# Patient Record
Sex: MALE | Race: WHITE | NOT HISPANIC OR LATINO | URBAN - METROPOLITAN AREA
[De-identification: names, ages, dates, MRNs, and addresses within clinical notes are randomized per-mention and may not be internally consistent; named-entity substitution may affect disease eponyms.]

---

## 2018-07-16 RX ORDER — PANTOPRAZOLE SODIUM 40 MG/1
TABLET, DELAYED RELEASE ORAL
COMMUNITY

## 2018-07-16 RX ORDER — NAPROXEN SODIUM 275 MG/1
TABLET ORAL
COMMUNITY
Start: 2017-05-18 | End: 2020-01-08 | Stop reason: SDUPTHER

## 2018-07-16 RX ORDER — RIZATRIPTAN BENZOATE 10 MG/1
TABLET ORAL
COMMUNITY
Start: 2017-05-18 | End: 2020-06-05 | Stop reason: SDUPTHER

## 2018-07-18 ENCOUNTER — OFFICE VISIT (OUTPATIENT)
Dept: NEUROLOGY | Facility: CLINIC | Age: 23
End: 2018-07-18
Payer: COMMERCIAL

## 2018-07-18 VITALS — SYSTOLIC BLOOD PRESSURE: 118 MMHG | DIASTOLIC BLOOD PRESSURE: 72 MMHG | HEART RATE: 77 BPM | RESPIRATION RATE: 17 BRPM

## 2018-07-18 DIAGNOSIS — G43.109 MIGRAINE WITH AURA AND WITHOUT STATUS MIGRAINOSUS, NOT INTRACTABLE: Primary | ICD-10-CM

## 2018-07-18 PROCEDURE — 99213 OFFICE O/P EST LOW 20 MIN: CPT | Performed by: PSYCHIATRY & NEUROLOGY

## 2018-07-18 NOTE — PROGRESS NOTES
"7/18/2018    Subjective      Patient ID: Cordell Solitario is a 22 y.o. male.    HPI     The patient was back in the office today.  He was last seen in January.  Thankfully he is only had 2 bad headaches since I last saw him.  Both were associated with visual complaints.  He is using the Maxalt.  I reminded him that he can repeat the Maxalt.  1 of his headaches was especially prolonged.  I told him that he can take the Maxalt with the naproxen on a consistent basis he may repeat these 2 drugs together in 2 hours if he does not improve.  Again it should be best if he takes the Maxalt after he has had the resolution of his visual complaints, the aura he is getting some insight on his GI upset and this may be a \"vagus nerve problem\".  Is seeing both the cardiologist and a GI doctor.  He is about to reenter school in September.  His mood is good.  He is sleeping well.  He is comfortable with his headache regimen the way it is.    Current Outpatient Prescriptions   Medication Sig Dispense Refill   • naproxen sodium (ANAPROX) 275 mg tablet take 1 tablet by oral route up to tid p meals prn     • pantoprazole (PROTONIX) 40 mg EC tablet take 1 tablet by oral route  every day     • rizatriptan (MAXALT) 10 mg tablet take 1 tablet every 2 hrs as needed, up to 2/d, 2d/wk       No current facility-administered medications for this visit.        The following have been reviewed and updated as appropriate in this visit:       Review of Systems   Neg    Objective     Physical Exam    On examination the patient is well-developed and well-nourished.The patient is alert and oriented to time place and person, the patient has intact recent and remote memory, with appropriate attention span and concentration.  The patient is appropriate with language,can name objects, can repeat phrases, and has has intact spontaneous speech.  The patient is aware of current events, past history, and he has an intact vocabulary.  There is normal station " and gait.  There is normal strength and muscle tone of all major muscle groups of the upper and lower extremities.  There is no fasciculations or atrophy.  Cardiac evaluation is normal, with regular rate and rhythm.  There are no murmurs or gallops.      Assessment/Plan   Problem List Items Addressed This Visit     None      Visit Diagnoses     Migraine with aura and without status migrainosus, not intractable    -  Primary    Relevant Medications    naproxen sodium (ANAPROX) 275 mg tablet    rizatriptan (MAXALT) 10 mg tablet          I will continue him on his present medication.  I would like to see him in 6 months.  He should reach out to me if there are any problems or prolonged headaches in the meantime.

## 2018-07-31 ENCOUNTER — TELEPHONE (OUTPATIENT)
Dept: NEUROLOGY | Facility: CLINIC | Age: 23
End: 2018-07-31

## 2018-08-06 ENCOUNTER — LAB REQUISITION (OUTPATIENT)
Dept: LAB | Facility: HOSPITAL | Age: 23
End: 2018-08-06
Attending: OTOLARYNGOLOGY
Payer: COMMERCIAL

## 2018-08-06 DIAGNOSIS — J03.90 ACUTE TONSILLITIS: ICD-10-CM

## 2018-08-06 PROCEDURE — 87070 CULTURE OTHR SPECIMN AEROBIC: CPT | Performed by: OTOLARYNGOLOGY

## 2018-08-08 LAB — MICROORGANISM SPEC CULT: NORMAL

## 2018-10-11 ENCOUNTER — HOSPITAL ENCOUNTER (EMERGENCY)
Facility: HOSPITAL | Age: 23
Discharge: HOME | End: 2018-10-11
Attending: EMERGENCY MEDICINE | Admitting: STUDENT IN AN ORGANIZED HEALTH CARE EDUCATION/TRAINING PROGRAM

## 2018-10-11 VITALS
HEART RATE: 79 BPM | OXYGEN SATURATION: 99 % | BODY MASS INDEX: 25.27 KG/M2 | SYSTOLIC BLOOD PRESSURE: 141 MMHG | TEMPERATURE: 97.9 F | RESPIRATION RATE: 18 BRPM | HEIGHT: 67 IN | DIASTOLIC BLOOD PRESSURE: 65 MMHG | WEIGHT: 161 LBS

## 2018-10-11 DIAGNOSIS — S01.111A EYEBROW LACERATION, RIGHT, INITIAL ENCOUNTER: Primary | ICD-10-CM

## 2018-10-11 PROCEDURE — 90715 TDAP VACCINE 7 YRS/> IM: CPT | Performed by: PHYSICIAN ASSISTANT

## 2018-10-11 PROCEDURE — 99283 EMERGENCY DEPT VISIT LOW MDM: CPT

## 2018-10-11 PROCEDURE — 25010000002 TDAP 5-2.5-18.5 LF-MCG/0.5 SUSPENSION: Performed by: PHYSICIAN ASSISTANT

## 2018-10-11 PROCEDURE — 90471 IMMUNIZATION ADMIN: CPT | Performed by: PHYSICIAN ASSISTANT

## 2018-10-11 RX ORDER — PANTOPRAZOLE SODIUM 40 MG/1
40 TABLET, DELAYED RELEASE ORAL DAILY
COMMUNITY

## 2018-10-11 RX ORDER — LIDOCAINE HYDROCHLORIDE AND EPINEPHRINE 10; 10 MG/ML; UG/ML
10 INJECTION, SOLUTION INFILTRATION; PERINEURAL ONCE
Status: COMPLETED | OUTPATIENT
Start: 2018-10-11 | End: 2018-10-11

## 2018-10-11 RX ADMIN — LIDOCAINE HYDROCHLORIDE,EPINEPHRINE BITARTRATE 10 ML: 10; .01 INJECTION, SOLUTION INFILTRATION; PERINEURAL at 22:52

## 2018-10-11 RX ADMIN — TETANUS TOXOID, REDUCED DIPHTHERIA TOXOID AND ACELLULAR PERTUSSIS VACCINE, ADSORBED 0.5 ML: 5; 2.5; 8; 8; 2.5 SUSPENSION INTRAMUSCULAR at 22:51

## 2018-10-12 NOTE — DISCHARGE INSTRUCTIONS
Keep wound clean and dry.  Wound recheck in 2 days, suture removal in 5 days.  Observe for any sign of infection.  Return to the emergency department immediately if any change or worsening of symptoms.

## 2018-10-12 NOTE — ED PROVIDER NOTES
Subjective   14-year-old male presents to emergency department with right eyebrow laceration.  Struck another player's head while playing flag football.  No loss of consciousness vision changes or photophobia.  No neck pain nausea vomiting unilateral weakness paresis paresthesias or other symptoms or injuries.  Last tetanus immunization was unknown.        Illness   Location:  Per HPI  Quality:  Per HPI  Severity:  Moderate  Onset quality:  Sudden  Duration:  1 hour  Timing:  Constant  Progression:  Unchanged  Chronicity:  New  Context:  Per HPI  Relieved by:  Per HPI  Worsened by:  Per HPI  Ineffective treatments:  Per HPI  Associated symptoms: no chest pain, no congestion, no cough, no headaches, no nausea, no shortness of breath and no vomiting        Review of Systems   HENT: Negative for congestion.         Per HPI   Respiratory: Negative for cough and shortness of breath.    Cardiovascular: Negative for chest pain.   Gastrointestinal: Negative for nausea and vomiting.   Skin: Positive for wound.        Per HPI   Neurological: Negative for headaches.       Past Medical History:   Diagnosis Date   • GERD (gastroesophageal reflux disease)        No Known Allergies    History reviewed. No pertinent surgical history.    History reviewed. No pertinent family history.    Social History     Social History   • Marital status: Single     Social History Main Topics   • Smoking status: Current Some Day Smoker   • Alcohol use Yes   • Drug use: No   • Sexual activity: Defer     Other Topics Concern   • Not on file           Objective   Physical Exam   Constitutional: He is oriented to person, place, and time. He appears well-developed and well-nourished. No distress.   HENT:   Head: Normocephalic.   Right Ear: External ear normal.   Left Ear: External ear normal.   Nose: Nose normal.   Mouth/Throat: Oropharynx is clear and moist. No oropharyngeal exudate.   1.5 centimeter laceration to superolateral right orbital rim, no  bony tenderness crepitus or step-off.  TMs and canals are clear oropharynx is clear.   Eyes: Pupils are equal, round, and reactive to light. Conjunctivae and EOM are normal. Right eye exhibits no discharge. Left eye exhibits no discharge. No scleral icterus.   Neck: Normal range of motion. Neck supple. No JVD present. No tracheal deviation present. No thyromegaly present.   Cardiovascular: Normal rate.    Pulmonary/Chest: Effort normal. No stridor. No respiratory distress.   Abdominal: Soft. He exhibits no distension.   Musculoskeletal: Normal range of motion. He exhibits no edema, tenderness or deformity.   Neurological: He is alert and oriented to person, place, and time. No cranial nerve deficit. He exhibits normal muscle tone. Coordination normal.   Skin: Skin is warm and dry. No rash noted. He is not diaphoretic. No erythema. No pallor.   Psychiatric: He has a normal mood and affect. His behavior is normal. Judgment and thought content normal.   Nursing note and vitals reviewed.      Laceration Repair  Date/Time: 10/12/2018 12:01 AM  Performed by: ANTONIA BROOKS  Authorized by: IDANIA PILLAI     Consent:     Consent obtained:  Verbal    Consent given by:  Patient    Risks discussed:  Poor cosmetic result  Anesthesia (see MAR for exact dosages):     Anesthesia method:  None  Laceration details:     Location: Right eyebrow.    Length (cm):  2    Depth (mm):  3  Repair type:     Repair type:  Simple  Pre-procedure details:     Preparation:  Patient was prepped and draped in usual sterile fashion  Exploration:     Hemostasis achieved with:  Direct pressure    Wound exploration: entire depth of wound probed and visualized      Wound extent: no foreign bodies/material noted, no nerve damage noted, no tendon damage noted and no underlying fracture noted    Treatment:     Wound cleansed with: Chlor-prep.    Amount of cleaning:  Standard    Irrigation solution:  Sterile saline    Irrigation volume:  20     "Irrigation method:  Syringe    Visualized foreign bodies/material removed: yes    Skin repair:     Repair method:  Sutures    Suture size:  6-0    Suture material:  Nylon    Suture technique:  Simple interrupted    Number of sutures:  8  Approximation:     Approximation:  Close    Vermilion border: well-aligned    Post-procedure details:     Dressing:  Open (no dressing)    Patient tolerance of procedure:  Tolerated well, no immediate complications               ED Course      No results found for this or any previous visit (from the past 24 hour(s)).  Note: In addition to lab results from this visit, the labs listed above may include labs taken at another facility or during a different encounter within the last 24 hours. Please correlate lab times with ED admission and discharge times for further clarification of the services performed during this visit.    No orders to display     Vitals:    10/11/18 2134   BP: 141/65   Pulse: 79   Resp: 18   Temp: 97.9 °F (36.6 °C)   SpO2: 99%   Weight: 73 kg (161 lb)   Height: 170.2 cm (67\")     Medications   lidocaine-EPINEPHrine (XYLOCAINE W/EPI) 1 %-1:749404 injection 10 mL (10 mL Injection Given by Other 10/11/18 2252)   Tdap (BOOSTRIX) injection 0.5 mL (0.5 mL Intramuscular Given 10/11/18 2251)     ECG/EMG Results (last 24 hours)     ** No results found for the last 24 hours. **                  [facility]      Final diagnoses:   Eyebrow laceration, right, initial encounter            Berlin Escalante PA-C  10/12/18 0002    "

## 2019-01-09 ENCOUNTER — OFFICE VISIT (OUTPATIENT)
Dept: NEUROLOGY | Facility: CLINIC | Age: 24
End: 2019-01-09
Payer: COMMERCIAL

## 2019-01-09 VITALS
DIASTOLIC BLOOD PRESSURE: 68 MMHG | SYSTOLIC BLOOD PRESSURE: 122 MMHG | RESPIRATION RATE: 14 BRPM | OXYGEN SATURATION: 98 % | HEART RATE: 64 BPM

## 2019-01-09 DIAGNOSIS — G43.109 MIGRAINE WITH AURA AND WITHOUT STATUS MIGRAINOSUS, NOT INTRACTABLE: Primary | ICD-10-CM

## 2019-01-09 PROCEDURE — 99212 OFFICE O/P EST SF 10 MIN: CPT | Performed by: PSYCHIATRY & NEUROLOGY

## 2019-01-09 NOTE — PROGRESS NOTES
1/9/2019    Subjective      Patient ID: Cordell Solitario is a 23 y.o. male.    HPI     *Cordell was back in the office today.  I last saw him about 6 months ago.  His headaches remain well controlled.  He is currently getting about 1-2 headaches per month.  He is taking the Maxalt with the naproxen and getting quick complete relief.  His stomach is still an issue and has autonomic dysfunction with his vagus nerve.  He is on Protonix.  His mood is good.  He is back in school.  His headaches are at a very manageable level.**    Current Outpatient Prescriptions   Medication Sig Dispense Refill   • naproxen sodium (ANAPROX) 275 mg tablet take 1 tablet by oral route up to tid p meals prn     • pantoprazole (PROTONIX) 40 mg EC tablet take 1 tablet by oral route  every day     • rizatriptan (MAXALT) 10 mg tablet take 1 tablet every 2 hrs as needed, up to 2/d, 2d/wk       No current facility-administered medications for this visit.        The following have been reviewed and updated as appropriate in this visit:       Review of Systems  Neg  Objective     Physical Exam    On examination the patient is well-developed and well-nourished.The patient is alert and oriented to time place and person, the patient has intact recent and remote memory, with appropriate attention span and concentration.  The patient is appropriate with language,can name objects, can repeat phrases, and has has intact spontaneous speech.  The patient is aware of current events, past history, and he has an intact vocabulary.  There is normal station and gait.  There is normal strength and muscle tone of all major muscle groups of the upper and lower extremities.  There is no fasciculations or atrophy.  Cardiac evaluation is normal, with regular rate and rhythm.  There are no murmurs or gallops.      Assessment/Plan   Problem List Items Addressed This Visit     None      Visit Diagnoses     Migraine with aura and without status migrainosus, not  intractable    -  Primary          Overall Cordell is doing well.  I will continue him on his present medications.  I would like to see him back in approximately 6 months.  He should call me if he has any problems or questions in the meantime.

## 2019-07-11 ENCOUNTER — OFFICE VISIT (OUTPATIENT)
Dept: NEUROLOGY | Facility: CLINIC | Age: 24
End: 2019-07-11
Payer: COMMERCIAL

## 2019-07-11 VITALS
SYSTOLIC BLOOD PRESSURE: 100 MMHG | OXYGEN SATURATION: 98 % | RESPIRATION RATE: 14 BRPM | DIASTOLIC BLOOD PRESSURE: 62 MMHG | HEART RATE: 60 BPM

## 2019-07-11 DIAGNOSIS — G43.109 MIGRAINE WITH AURA AND WITHOUT STATUS MIGRAINOSUS, NOT INTRACTABLE: Primary | ICD-10-CM

## 2019-07-11 PROCEDURE — 99212 OFFICE O/P EST SF 10 MIN: CPT | Performed by: PSYCHIATRY & NEUROLOGY

## 2019-07-11 NOTE — PROGRESS NOTES
7/11/2019    Subjective      Patient ID: Cordell Solitario is a 23 y.o. male.    HPI     **The patient is back in the office today.  I saw him about 6 months ago.  His headaches are now about every 3 months.  When he does get a headache he takes the Maxalt and naproxen and it is effective.  Weather change seems to be a trigger.  His stomach is better he remains on the Protonix.  His mood and sleeping are okay.  He is doing well in school and is now 1/3-year and with at [employer].*    Current Outpatient Prescriptions   Medication Sig Dispense Refill   • naproxen sodium (ANAPROX) 275 mg tablet take 1 tablet by oral route up to tid p meals prn     • pantoprazole (PROTONIX) 40 mg EC tablet take 1 tablet by oral route  every day     • rizatriptan (MAXALT) 10 mg tablet take 1 tablet every 2 hrs as needed, up to 2/d, 2d/wk       No current facility-administered medications for this visit.        The following have been reviewed and updated as appropriate in this visit:  Problems       Review of Systems  Neg  Objective     Physical Exam    On examination the patient is well-developed and well-nourished.The patient is alert and oriented to time place and person, the patient has intact recent and remote memory, with appropriate attention span and concentration.  The patient is appropriate with language,can name objects, can repeat phrases, and has has intact spontaneous speech.  The patient is aware of current events, past history, and he has an intact vocabulary.  There is normal station and gait.  There is normal strength and muscle tone of all major muscle groups of the upper and lower extremities.  There is no fasciculations or atrophy.  Cardiac evaluation is normal, with regular rate and rhythm.  There are no murmurs or gallops.      Assessment/Plan   Problem List Items Addressed This Visit     None      Visit Diagnoses     Migraine with aura and without status migrainosus, not intractable    -   Primary          **I will continue his regimen as it is currently.  Patient is to call me if he is having any problems or questions in the meantime otherwise I will see him back in 6 to 9 months.*

## 2019-08-14 NOTE — TELEPHONE ENCOUNTER
Medicine Refill Request    Last Office Visit: 7/11/2019  Next Office Visit: 1/15/2020        Current Outpatient Prescriptions:   •  naproxen sodium (ANAPROX) 275 mg tablet, take 1 tablet by oral route up to tid p meals prn, Disp: , Rfl:   •  pantoprazole (PROTONIX) 40 mg EC tablet, take 1 tablet by oral route  every day, Disp: , Rfl:   •  rizatriptan (MAXALT) 10 mg tablet, take 1 tablet every 2 hrs as needed, up to 2/d, 2d/wk, Disp: , Rfl:       BP Readings from Last 3 Encounters:   07/11/19 100/62   01/09/19 122/68   07/18/18 118/72       Recent Lab results:  Lab Results   Component Value Date    CHOL 149 07/22/2016   ,   Lab Results   Component Value Date    HDL 51 07/22/2016   ,   Lab Results   Component Value Date    LDLCALC 87 07/22/2016   ,   Lab Results   Component Value Date    TRIG 53 07/22/2016        Lab Results   Component Value Date    GLUCOSE 78 07/22/2016   , No results found for: HGBA1C      Lab Results   Component Value Date    CREATININE 1.0 07/22/2016       Lab Results   Component Value Date    TSH 2.23 07/22/2016

## 2020-01-08 ENCOUNTER — OFFICE VISIT (OUTPATIENT)
Dept: NEUROLOGY | Facility: CLINIC | Age: 25
End: 2020-01-08
Payer: COMMERCIAL

## 2020-01-08 VITALS
RESPIRATION RATE: 14 BRPM | HEART RATE: 72 BPM | DIASTOLIC BLOOD PRESSURE: 80 MMHG | OXYGEN SATURATION: 98 % | SYSTOLIC BLOOD PRESSURE: 120 MMHG

## 2020-01-08 DIAGNOSIS — G43.109 MIGRAINE WITH AURA AND WITHOUT STATUS MIGRAINOSUS, NOT INTRACTABLE: Primary | ICD-10-CM

## 2020-01-08 PROCEDURE — 99212 OFFICE O/P EST SF 10 MIN: CPT | Performed by: PSYCHIATRY & NEUROLOGY

## 2020-01-08 RX ORDER — NAPROXEN SODIUM 275 MG/1
TABLET ORAL
Qty: 50 TABLET | Refills: 1 | Status: SHIPPED | OUTPATIENT
Start: 2020-01-08 | End: 2022-01-05 | Stop reason: SDUPTHER

## 2020-01-08 NOTE — PROGRESS NOTES
1/8/2020    Subjective      Patient ID: Cordell Solitario is a 24 y.o. male.    HPI     The patient is back in the office today.  I last saw him about 6 months ago.  He can remember only having about 1 headache.  This was at the time that school started and it was a rainy day.  He did take his Anaprox and rizatriptan together and this was effective.  He has been doing well in school.  He continues on his Protonix for his acid reflux prescribed by his GI doctor.  Overall he is comfortable.  His mood is good his sleeping is good.  Together we have agreed to see him about every 1 year.  Obviously if he has any problems or questions in the meantime he should be in touch with me.    Current Outpatient Medications   Medication Sig Dispense Refill   • naproxen sodium (ANAPROX) 275 mg tablet take 1 tablet by oral route up to tid p meals prn     • pantoprazole (PROTONIX) 40 mg EC tablet take 1 tablet by oral route  every day     • rizatriptan (MAXALT) 10 mg tablet take 1 tablet every 2 hrs as needed, up to 2/d, 2d/wk       No current facility-administered medications for this visit.        The following have been reviewed and updated as appropriate in this visit:  Problems       Review of Systems    Objective     Physical Exam    On examination the patient is well-developed and well-nourished.The patient is alert and oriented to time place and person, the patient has intact recent and remote memory, with appropriate attention span and concentration.  The patient is appropriate with language,can name objects, can repeat phrases, and has has intact spontaneous speech.  The patient is aware of current events, past history, and he has an intact vocabulary.  There is normal station and gait.  There is normal strength and muscle tone of all major muscle groups of the upper and lower extremities.  There is no fasciculations or atrophy.  Cardiac evaluation is normal, with regular rate and rhythm.  There are no murmurs or  junior.      Assessment/Plan   Problem List Items Addressed This Visit        Nervous    Migraine headache - Primary        *

## 2020-06-05 RX ORDER — RIZATRIPTAN BENZOATE 10 MG/1
10 TABLET ORAL ONCE AS NEEDED
Qty: 12 TABLET | Refills: 2 | Status: SHIPPED | OUTPATIENT
Start: 2020-06-05 | End: 2022-01-05 | Stop reason: SDUPTHER

## 2020-06-05 NOTE — TELEPHONE ENCOUNTER
Medicine Refill Request    Last Office Visit: 1/08/2020  Next Telemedicine Visit: 01/06/21      Current Outpatient Medications:   •  naproxen sodium (ANAPROX) 275 mg tablet, take 1 tablet by oral route up to tid p meals prn, Disp: 50 tablet, Rfl: 1  •  pantoprazole (PROTONIX) 40 mg EC tablet, take 1 tablet by oral route  every day, Disp: , Rfl:   •  rizatriptan (MAXALT) 10 mg tablet, take 1 tablet every 2 hrs as needed, up to 2/d, 2d/wk, Disp: , Rfl:       BP Readings from Last 3 Encounters:   01/08/20 120/80   07/11/19 100/62   01/09/19 122/68       Recent Lab results:  Lab Results   Component Value Date    CHOL 149 07/22/2016   ,   Lab Results   Component Value Date    HDL 51 07/22/2016   ,   Lab Results   Component Value Date    LDLCALC 87 07/22/2016   ,   Lab Results   Component Value Date    TRIG 53 07/22/2016        Lab Results   Component Value Date    GLUCOSE 78 07/22/2016   , No results found for: HGBA1C      Lab Results   Component Value Date    CREATININE 1.0 07/22/2016       Lab Results   Component Value Date    TSH 2.23 07/22/2016

## 2021-01-05 NOTE — PROGRESS NOTES
Verification of Patient Location:  The patient affirms they are currently located in the following state:[place]     Request for Consent:  You and I are about to have a telemedicine check-in or visit. This is allowed because you are already my patient, and you have requested it.  This telemedicine visit will be billed to your health insurance or you, if you are self-insured.  You understand you will be responsible for any copayments or coinsurances that apply to your telemedicine visit.  Before starting our telemedicine visit, I am required to get your consent for this virtual check-in or visit by telemedicine. Do you consent?      Patient Response to Request for Consent: Yes    The following have been reviewed and updated as appropriate in this visit:         Visit Documentation: I saw Cordell back in the office today.  He was seen using telemedicine he was seen about 1 year ago.  Thankfully over the 1 year that I have seen him he is only had 1 headache.  He continues to take the Anaprox and rizatriptan together and this is very effective.  He has been doing well in school he continues on the Protonix.  His mood is good and his sleeping is also good to get.  Overall he is doing quite well.  I offered him to transfer his care to his primary.  I made it clear that I was not asking him to leave the practice but it might be easier if he saw only his primary.  He would like to continue with me I have no problem with this.  He does not need any refills I will see him in 1 month I have asked him to call me if he has any problems or questions in the meantime.          Time Spent: 19

## 2021-01-06 ENCOUNTER — TELEMEDICINE (OUTPATIENT)
Dept: NEUROLOGY | Facility: CLINIC | Age: 26
End: 2021-01-06
Payer: COMMERCIAL

## 2021-01-06 DIAGNOSIS — G43.109 MIGRAINE WITH AURA AND WITHOUT STATUS MIGRAINOSUS, NOT INTRACTABLE: Primary | ICD-10-CM

## 2021-01-06 PROBLEM — B35.6 TINEA CRURIS: Status: ACTIVE | Noted: 2018-08-02

## 2021-01-06 PROBLEM — K21.9 GASTRO-ESOPHAGEAL REFLUX DISEASE WITHOUT ESOPHAGITIS: Status: ACTIVE | Noted: 2018-07-16

## 2021-01-06 PROBLEM — I86.1 SCROTAL VARICES: Status: ACTIVE | Noted: 2018-07-16

## 2021-01-06 PROBLEM — J03.90 ACUTE TONSILLITIS: Status: ACTIVE | Noted: 2018-08-02

## 2021-01-06 PROBLEM — G90.9 DISORDER OF AUTONOMIC NERVOUS SYSTEM: Status: ACTIVE | Noted: 2018-07-16

## 2021-01-06 PROCEDURE — 99213 OFFICE O/P EST LOW 20 MIN: CPT | Mod: 95 | Performed by: PSYCHIATRY & NEUROLOGY

## 2021-04-08 ENCOUNTER — HOSPITAL ENCOUNTER (OUTPATIENT)
Dept: GENERAL RADIOLOGY | Facility: HOSPITAL | Age: 26
Discharge: HOME | End: 2021-04-08
Admitting: NURSE PRACTITIONER

## 2021-04-08 ENCOUNTER — TRANSCRIBE ORDERS (OUTPATIENT)
Dept: GENERAL RADIOLOGY | Facility: HOSPITAL | Age: 26
End: 2021-04-08

## 2021-04-08 DIAGNOSIS — T14.90XA INJURY: ICD-10-CM

## 2021-04-08 DIAGNOSIS — M79.644 PAIN OF RIGHT MIDDLE FINGER: Primary | ICD-10-CM

## 2021-04-08 DIAGNOSIS — M79.644 PAIN OF RIGHT MIDDLE FINGER: ICD-10-CM

## 2021-04-08 PROCEDURE — 73140 X-RAY EXAM OF FINGER(S): CPT

## 2022-01-05 ENCOUNTER — TELEMEDICINE (OUTPATIENT)
Dept: NEUROLOGY | Facility: CLINIC | Age: 27
End: 2022-01-05
Payer: COMMERCIAL

## 2022-01-05 DIAGNOSIS — G43.009 MIGRAINE WITHOUT AURA AND WITHOUT STATUS MIGRAINOSUS, NOT INTRACTABLE: Primary | ICD-10-CM

## 2022-01-05 RX ORDER — RIZATRIPTAN BENZOATE 10 MG/1
10 TABLET ORAL ONCE AS NEEDED
Qty: 12 TABLET | Refills: 1 | Status: SHIPPED | OUTPATIENT
Start: 2022-01-05

## 2022-01-05 RX ORDER — NAPROXEN SODIUM 275 MG/1
TABLET ORAL
Qty: 50 TABLET | Refills: 1 | Status: SHIPPED | OUTPATIENT
Start: 2022-01-05

## 2022-01-05 NOTE — PROGRESS NOTES
Verification of Patient Location:  The patient affirms they are currently located in the following state: [place]    Request for Consent:    Audio and Video Encounter   Hello, my name is Johnnie Buenrostro MD.  Before we proceed, can you please verify your identification by telling me your full name and date of birth?  Can you tell me who is in the room with you?    You and I are about to have a telemedicine check-in or visit because you have requested it.  This is a live video-conference.  I am a real person, speaking to you in real time.  There is no one else with me on the video-conference.  However, when we use ([employer], [employer], etc) it is important for you to know that the video-conference may not be secure or private.  I am not recording this conversation and I am asking you not to record it.  This telemedicine visit will be billed to your health insurance or you, if you are self-insured.  You understand you will be responsible for any copayments or coinsurances that apply to your telemedicine visit.  Communication platform used for this encounter:  [email or web address]     Before starting our telemedicine visit, I am required to get your consent for this virtual check-in or visit by telemedicine. Do you consent?      Patient Response to Request for Consent:  Yes      Visit Documentation:  Subjective     Patient ID: Cordell Solitario is a 26 y.o. male.  1995      HPI    The following have been reviewed and updated as appropriate in this visit:        Review of Systems      Assessment/Plan   {There are no diagnoses linked to this encounter. (Refresh or delete this SmartLink)}    Time Spent:  I spent {[facility] Telemedicine Visit Time:2815863405} on this date of service performing the following activities: {Time based coding activities:80326}.